# Patient Record
Sex: MALE | Race: WHITE | NOT HISPANIC OR LATINO
[De-identification: names, ages, dates, MRNs, and addresses within clinical notes are randomized per-mention and may not be internally consistent; named-entity substitution may affect disease eponyms.]

---

## 2019-01-01 ENCOUNTER — RX RENEWAL (OUTPATIENT)
Age: 84
End: 2019-01-01

## 2019-01-01 ENCOUNTER — APPOINTMENT (OUTPATIENT)
Dept: NEPHROLOGY | Facility: CLINIC | Age: 84
End: 2019-01-01

## 2019-01-01 ENCOUNTER — APPOINTMENT (OUTPATIENT)
Dept: NEPHROLOGY | Facility: CLINIC | Age: 84
End: 2019-01-01
Payer: MEDICARE

## 2019-01-01 VITALS
RESPIRATION RATE: 16 BRPM | BODY MASS INDEX: 28.36 KG/M2 | HEART RATE: 66 BPM | DIASTOLIC BLOOD PRESSURE: 70 MMHG | SYSTOLIC BLOOD PRESSURE: 126 MMHG | HEIGHT: 73 IN | WEIGHT: 214 LBS

## 2019-01-01 VITALS
SYSTOLIC BLOOD PRESSURE: 98 MMHG | BODY MASS INDEX: 28.17 KG/M2 | WEIGHT: 208 LBS | DIASTOLIC BLOOD PRESSURE: 48 MMHG | HEART RATE: 66 BPM | HEIGHT: 72 IN

## 2019-01-01 DIAGNOSIS — I10 ESSENTIAL (PRIMARY) HYPERTENSION: ICD-10-CM

## 2019-01-01 DIAGNOSIS — R94.30 ABNORMAL RESULT OF CARDIOVASCULAR FUNCTION STUDY, UNSPECIFIED: ICD-10-CM

## 2019-01-01 DIAGNOSIS — D64.9 ANEMIA, UNSPECIFIED: ICD-10-CM

## 2019-01-01 DIAGNOSIS — N17.9 ACUTE KIDNEY FAILURE, UNSPECIFIED: ICD-10-CM

## 2019-01-01 DIAGNOSIS — I35.0 NONRHEUMATIC AORTIC (VALVE) STENOSIS: ICD-10-CM

## 2019-01-01 DIAGNOSIS — N25.81 SECONDARY HYPERPARATHYROIDISM OF RENAL ORIGIN: ICD-10-CM

## 2019-01-01 DIAGNOSIS — N18.4 CHRONIC KIDNEY DISEASE, STAGE 4 (SEVERE): ICD-10-CM

## 2019-01-01 DIAGNOSIS — Z95.810 PRESENCE OF AUTOMATIC (IMPLANTABLE) CARDIAC DEFIBRILLATOR: ICD-10-CM

## 2019-01-01 DIAGNOSIS — E55.9 VITAMIN D DEFICIENCY, UNSPECIFIED: ICD-10-CM

## 2019-01-01 PROCEDURE — 99214 OFFICE O/P EST MOD 30 MIN: CPT

## 2019-01-01 RX ORDER — HYDROCHLOROTHIAZIDE 12.5 MG/1
12.5 TABLET ORAL DAILY
Refills: 0 | Status: DISCONTINUED | COMMUNITY
End: 2019-01-01

## 2019-01-01 RX ORDER — FERROUS SULFATE 325(65) MG
325 (65 FE) TABLET ORAL
Refills: 0 | Status: DISCONTINUED | COMMUNITY
End: 2019-01-01

## 2019-01-01 RX ORDER — TICAGRELOR 60 MG/1
60 TABLET ORAL
Refills: 0 | Status: DISCONTINUED | COMMUNITY
End: 2019-01-01

## 2019-01-01 RX ORDER — CHROMIUM 200 MCG
1000 TABLET ORAL DAILY
Refills: 0 | Status: DISCONTINUED | COMMUNITY
End: 2019-01-01

## 2019-01-01 RX ORDER — NITROGLYCERIN 0.3 MG/1
0.3 TABLET SUBLINGUAL
Refills: 0 | Status: ACTIVE | COMMUNITY

## 2019-01-01 RX ORDER — METOPROLOL SUCCINATE 25 MG/1
25 TABLET, EXTENDED RELEASE ORAL DAILY
Refills: 0 | Status: DISCONTINUED | COMMUNITY
End: 2019-01-01

## 2019-06-04 ENCOUNTER — RECORD ABSTRACTING (OUTPATIENT)
Age: 84
End: 2019-06-04

## 2019-06-04 DIAGNOSIS — Z86.69 PERSONAL HISTORY OF OTHER DISEASES OF THE NERVOUS SYSTEM AND SENSE ORGANS: ICD-10-CM

## 2019-06-04 DIAGNOSIS — Z87.39 PERSONAL HISTORY OF OTHER DISEASES OF THE MUSCULOSKELETAL SYSTEM AND CONNECTIVE TISSUE: ICD-10-CM

## 2019-06-04 DIAGNOSIS — M25.559 PAIN IN UNSPECIFIED HIP: ICD-10-CM

## 2019-06-04 DIAGNOSIS — K86.9 DISEASE OF PANCREAS, UNSPECIFIED: ICD-10-CM

## 2019-06-04 DIAGNOSIS — Z85.820 PERSONAL HISTORY OF MALIGNANT MELANOMA OF SKIN: ICD-10-CM

## 2019-06-04 DIAGNOSIS — Z87.438 PERSONAL HISTORY OF OTHER DISEASES OF MALE GENITAL ORGANS: ICD-10-CM

## 2019-06-04 DIAGNOSIS — Z85.118 PERSONAL HISTORY OF OTHER MALIGNANT NEOPLASM OF BRONCHUS AND LUNG: ICD-10-CM

## 2019-06-04 DIAGNOSIS — N28.1 CYST OF KIDNEY, ACQUIRED: ICD-10-CM

## 2019-06-04 DIAGNOSIS — Z86.79 PERSONAL HISTORY OF OTHER DISEASES OF THE CIRCULATORY SYSTEM: ICD-10-CM

## 2019-06-04 DIAGNOSIS — E21.3 HYPERPARATHYROIDISM, UNSPECIFIED: ICD-10-CM

## 2019-06-04 DIAGNOSIS — Z87.442 PERSONAL HISTORY OF URINARY CALCULI: ICD-10-CM

## 2019-06-04 DIAGNOSIS — I25.2 OLD MYOCARDIAL INFARCTION: ICD-10-CM

## 2019-06-04 DIAGNOSIS — M17.10 UNILATERAL PRIMARY OSTEOARTHRITIS, UNSPECIFIED KNEE: ICD-10-CM

## 2019-06-04 PROBLEM — Z00.00 ENCOUNTER FOR PREVENTIVE HEALTH EXAMINATION: Status: ACTIVE | Noted: 2019-06-04

## 2019-06-04 RX ORDER — PARICALCITOL 1 UG/1
1 CAPSULE, LIQUID FILLED ORAL
Refills: 0 | Status: DISCONTINUED | COMMUNITY
End: 2019-06-04

## 2019-06-04 RX ORDER — CEPHALEXIN 500 MG/1
500 CAPSULE ORAL
Refills: 0 | Status: DISCONTINUED | COMMUNITY
End: 2019-06-04

## 2019-06-04 RX ORDER — ALLOPURINOL 300 MG/1
300 TABLET ORAL DAILY
Refills: 0 | Status: ACTIVE | COMMUNITY

## 2019-06-04 RX ORDER — ASPIRIN 81 MG/1
81 TABLET, CHEWABLE ORAL DAILY
Refills: 0 | Status: ACTIVE | COMMUNITY

## 2019-06-04 RX ORDER — SILODOSIN 8 MG/1
8 CAPSULE ORAL
Refills: 0 | Status: DISCONTINUED | COMMUNITY
End: 2019-06-04

## 2019-06-04 RX ORDER — TERAZOSIN 5 MG/1
5 CAPSULE ORAL
Refills: 0 | Status: ACTIVE | COMMUNITY

## 2019-06-04 RX ORDER — CALCITRIOL 0.25 UG/1
0.25 CAPSULE, LIQUID FILLED ORAL
Refills: 0 | Status: DISCONTINUED | COMMUNITY
End: 2019-06-04

## 2019-06-04 RX ORDER — ROSUVASTATIN CALCIUM 20 MG/1
20 TABLET, FILM COATED ORAL DAILY
Refills: 0 | Status: ACTIVE | COMMUNITY

## 2019-06-24 ENCOUNTER — APPOINTMENT (OUTPATIENT)
Dept: NEPHROLOGY | Facility: CLINIC | Age: 84
End: 2019-06-24
Payer: MEDICARE

## 2019-06-24 VITALS
WEIGHT: 218 LBS | HEIGHT: 73 IN | SYSTOLIC BLOOD PRESSURE: 132 MMHG | DIASTOLIC BLOOD PRESSURE: 78 MMHG | BODY MASS INDEX: 28.89 KG/M2

## 2019-06-24 DIAGNOSIS — E87.5 HYPERKALEMIA: ICD-10-CM

## 2019-06-24 PROCEDURE — 99214 OFFICE O/P EST MOD 30 MIN: CPT

## 2019-06-24 RX ORDER — BIMATOPROST 0.1 MG/ML
0.01 SOLUTION/ DROPS OPHTHALMIC DAILY
Refills: 0 | Status: ACTIVE | COMMUNITY

## 2019-06-24 NOTE — REVIEW OF SYSTEMS
[Feeling Tired] : feeling tired [Loss Of Hearing] : hearing loss [Recent Weight Loss (___ Lbs)] : recent [unfilled] ~Ulb weight loss [Cough] : cough [SOB on Exertion] : shortness of breath during exertion [Joint Pain] : joint pain [Easy Bruising] : a tendency for easy bruising [Itching] : itching [Negative] : Neurological [FreeTextEntry2] : Tires easily. Has been trying to lose weight.  [de-identified] : "not as bad as it used to be"  Believes it is the change of seasons [FreeTextEntry6] : [place] is "about the same".  Chronic cough. [FreeTextEntry9] : knees, hips, shoulders

## 2019-06-24 NOTE — PHYSICAL EXAM
[General Appearance - Alert] : alert [General Appearance - Well Nourished] : well nourished [General Appearance - In No Acute Distress] : in no acute distress [General Appearance - Well-Appearing] : healthy appearing [General Appearance - Well Developed] : well developed [Extraocular Movements] : extraocular movements were intact [] : no respiratory distress [Exaggerated Use Of Accessory Muscles For Inspiration] : no accessory muscle use [Auscultation Breath Sounds / Voice Sounds] : lungs were clear to auscultation bilaterally [Heart Rate And Rhythm] : heart rate was normal and rhythm regular [Heart Sounds] : normal S1 and S2 [Heart Sounds Pericardial Friction Rub] : no pericardial rub [Heart Sounds Gallop] : no gallops [Bowel Sounds] : normal bowel sounds [Edema] : there was no peripheral edema [Abdomen Tenderness] : non-tender [Abnormal Walk] : normal gait [Abdomen Soft] : soft [Involuntary Movements] : no involuntary movements were seen [Skin Color & Pigmentation] : normal skin color and pigmentation [No Focal Deficits] : no focal deficits [Skin Turgor] : normal skin turgor [Oriented To Time, Place, And Person] : oriented to person, place, and time [FreeTextEntry1] : 3/6 TIM and 2/4 DM best heard in the RUSB

## 2019-06-24 NOTE — HISTORY OF PRESENT ILLNESS
[FreeTextEntry1] : Kenyon Bravo is an 84 year old gentleman that I see for his CKD.  His medical history is outlined below.  He has been doing "not bad".  He saw a pain management physician this morning.  His full ROS is below.

## 2019-06-24 NOTE — ASSESSMENT
[FreeTextEntry1] : Kenyon oJseph is an 84 year old gentleman with Stage IV chronic kidney disease and the following recent BUN/creatinine levels:  67/3.64 (03/09/2018), 56/3.61 (11/13/2018) and most recently 77/4.14 (06/18/2019).  His urine protein to creatinine ratio is 292 mg/g (normal is less than 128 mg/g).  The differential diagnosis of his CKD includes hypertensive nephrosclerosis, atheroembolic disease, reflux nephropathy and a possible contribution from the thiazide diuretic.  His blood pressure is well controlled.  His serum phosphorous is controlled (4.6 mg/dL). The intact PTH is 77 pg/cc (controlled).  His diabetes mellitus is well controlled (HbA1c 6.1%).  Mr. Freeman's last renal ultrasound was on 02/23/2017 (12.3 cm right kidney with multiple cysts, 14.4 cm with multiple cysts, calcification in the upper pole of the right kidney measures 8 mm).\par \par (1) Stage IV CKD\par (2) Anemia.  Hb 9.8 g/dL (TSAT 21%, ferritin 132 ng/cc)\par (3) Hyperphosphatemia.  Controlled.\par (4) Secondary hyperparathyroidism\par (5) Renal cysts.  One calcified renal cyst (has seen Dr. Paul Perez re: this).\par \par PLAN:\par \par (1) Continue the Zemplar for the secondary hyperparathyroidism\par (2) Continue to take the oral iron tablet.  Increase its frequency to daily.\par (3) We talked about hemodialysis and peritoneal dialysis.  I will make sure that Mr. Bravo gets a DVD re: life choices for Stage V chronic kidney disease.  He will decide which of these treatments he wants.  We talked about home versus in center dialysis today also.  All questions were answered.  I will follow up with Mr. Bravo on the phone.  He will see me in September 2019 with the following lab studies: CMP, CBC, phosphorous, intact PTH, iron, TIBC, ferritin.

## 2019-09-17 PROBLEM — I10 HYPERTENSION: Status: ACTIVE | Noted: 2019-06-04

## 2019-09-17 NOTE — CONSULT LETTER
[Dear  ___] : Dear  [unfilled], [Consult Letter:] : I had the pleasure of evaluating your patient, [unfilled]. [Consult Closing:] : Thank you very much for allowing me to participate in the care of this patient.  If you have any questions, please do not hesitate to contact me. [Sincerely,] : Sincerely, [FreeTextEntry3] : Jose Daniel [DrLuis  ___] : Dr. GLEASON [DrLuis ___] : Dr. GLEASON

## 2019-09-17 NOTE — REVIEW OF SYSTEMS
[As Noted in HPI] : as noted in HPI [SOB on Exertion] : shortness of breath during exertion [Nocturia] : nocturia [Negative] : Heme/Lymph [FreeTextEntry9] : cramping at times

## 2019-09-17 NOTE — HISTORY OF PRESENT ILLNESS
[FreeTextEntry1] : Kenyon Bravo is an 84-year old gentleman that I see for his CKD.  His medical history is outlined below.  He feels well.  He had a nuclear stress test with Dr. Nascimento last week.  He saw urology (Dr. Perez).  He is planning on seeing pain management in 2 days because his back has been "really hurting me".  He takes Tylenol for the pain. His appetite has been good. He denies any nausea or vomiting.  He occasionally has dyspnea with exertion. This is without change.  His weight has been stable.  He denies having any lethargy though his wife reports that he becomes very tired.  He naps in the afternoons. His full ROS is below.

## 2019-09-17 NOTE — PHYSICAL EXAM
[General Appearance - In No Acute Distress] : in no acute distress [General Appearance - Alert] : alert [Sclera] : the sclera and conjunctiva were normal [Neck Appearance] : the appearance of the neck was normal [Extraocular Movements] : extraocular movements were intact [] : no respiratory distress [Exaggerated Use Of Accessory Muscles For Inspiration] : no accessory muscle use [Respiration, Rhythm And Depth] : normal respiratory rhythm and effort [Heart Sounds] : normal S1 and S2 [Heart Rate And Rhythm] : heart rate was normal and rhythm regular [Heart Sounds Pericardial Friction Rub] : no pericardial rub [Heart Sounds Gallop] : no gallops [FreeTextEntry1] : Minimal pedal edema bilaterally [Bowel Sounds] : normal bowel sounds [Abdomen Soft] : soft [Abdomen Tenderness] : non-tender [Abnormal Walk] : normal gait [Involuntary Movements] : no involuntary movements were seen [Skin Turgor] : normal skin turgor [Skin Color & Pigmentation] : normal skin color and pigmentation [No Focal Deficits] : no focal deficits [Oriented To Time, Place, And Person] : oriented to person, place, and time [Impaired Insight] : insight and judgment were intact [Affect] : the affect was normal [Mood] : the mood was normal

## 2019-09-17 NOTE — ASSESSMENT
[FreeTextEntry1] : Kenyon Joseph is an 84 year old gentleman with Stage IV chronic kidney disease and the following recent BUN/creatinine levels:  67/3.64 (03/09/2018), 56/3.61 (11/13/2018)  77/4.14 (06/18/2019), and most recently 72/3.80 (09/04/2019).   The prior urine protein to creatinine ratio was 292 mg/g (normal is less than 128 mg/g).  \par \par The differential diagnosis of his CKD includes hypertensive nephrosclerosis, atheroembolic disease, reflux nephropathy and a possible contribution from the thiazide diuretic.  His blood pressure is well controlled.  His serum phosphorous is controlled (4.6 mg/dL). The intact PTH is 75 pg/cc (controlled).  His diabetes mellitus is well controlled (HbA1c 5.8%).  Mr. Freeman's last renal ultrasound was on 02/23/2017 (12.3 cm right kidney with multiple cysts, 14.4 cm with multiple cysts, calcification in the upper pole of the right kidney measures 8 mm).\par \par (1) Stage IV CKD.  Stable.\par (2) Anemia.  Hb 10.2 g/dL (TSAT 21 --> 27%, ferritin 132 --> 139 ng/cc)\par (3) Hyperphosphatemia.  Controlled.\par (4) Secondary hyperparathyroidism.  Controlled. \par (5) Renal cysts.  One calcified renal cyst (has seen Dr. Paul Perez re: this).\par \par PLAN:\par \par (1) Continue the Zemplar for the secondary hyperparathyroidism\par (2) Continue to take the oral iron tablet daily. \par (3) We talked about hemodialysis and peritoneal dialysis.  I will make sure that Mr. Bravo gets a DVD re: life choices for Stage V chronic kidney disease.  He will decide which of these treatments he wants.  Mr. Bravo will see me again in four months with the following lab studies: CMP, CBC, phosphorous, intact PTH, iron, TIBC, ferritin.

## 2019-12-23 PROBLEM — E55.9 VITAMIN D DEFICIENCY: Status: ACTIVE | Noted: 2019-06-04

## 2019-12-23 PROBLEM — N25.81 SECONDARY HYPERPARATHYROIDISM: Status: ACTIVE | Noted: 2019-06-04

## 2019-12-23 PROBLEM — N17.9 ACUTE KIDNEY FAILURE: Status: ACTIVE | Noted: 2019-01-01

## 2019-12-23 PROBLEM — N18.4 STAGE 4 CHRONIC KIDNEY DISEASE: Status: ACTIVE | Noted: 2019-06-04

## 2019-12-23 PROBLEM — I35.0 AORTIC STENOSIS: Status: ACTIVE | Noted: 2019-01-01

## 2019-12-23 PROBLEM — D64.9 ANEMIA: Status: ACTIVE | Noted: 2019-06-04

## 2019-12-23 PROBLEM — Z95.810 AICD (AUTOMATIC CARDIOVERTER/DEFIBRILLATOR) PRESENT: Status: ACTIVE | Noted: 2019-01-01

## 2019-12-23 PROBLEM — R94.30 CARDIAC LV EJECTION FRACTION <20%: Status: ACTIVE | Noted: 2019-01-01

## 2019-12-23 NOTE — CONSULT LETTER
[Dear  ___] : Dear  [unfilled], [Consult Letter:] : I had the pleasure of evaluating your patient, [unfilled]. [Consult Closing:] : Thank you very much for allowing me to participate in the care of this patient.  If you have any questions, please do not hesitate to contact me. [Sincerely,] : Sincerely, [DrLuis  ___] : Dr. GLEASON [FreeTextEntry3] : Jose Daniel [DrLuis ___] : Dr. GLEASON

## 2019-12-23 NOTE — ASSESSMENT
[FreeTextEntry1] : Kenyon Joseph is an 84 year old gentleman with Stage IV chronic kidney disease and the following recent BUN/creatinine levels:  67/3.64 (03/09/2018), 56/3.61 (11/13/2018), 77/4.14 (06/18/2019), 70/4.1 (11/25/2019) who was recently admitted to [facility] with aortic stenosis, acute kidney injury, and is currently s/p AICD/PPM.  I currently do not have a copy of the hospital course.  His serum creatinine increased to 6 mg/dL (BUN/creatinine 107/6.0 on 12/10/2019).  I do not have any of the post hospital lab studies.\par \par His last urine protein to creatinine ratio was 292 mg/g (normal is less than 128 mg/g).  The differential diagnosis of his CKD includes hypertensive nephrosclerosis, atheroembolic disease, reflux nephropathy and a possible contribution from the thiazide diuretic (which he is currently off of)..  His blood pressure is currently on the low side.  Mr. Freeman's last renal ultrasound was on 02/23/2017 (12.3 cm right kidney with multiple cysts, 14.4 cm with multiple cysts, calcification in the upper pole of the right kidney measures 8 mm).\par \par (1) Stage IV CKD\par (2) Recent DENA\par (3) Anemia.  \par (4) Hyperphosphatemia.  \par (5) Secondary hyperparathyroidism\par (6) Renal cysts.  One calcified renal cyst (has seen Dr. Paul Perez re: this).\par \par PLAN:\par \par (1) Continue the Zemplar for the secondary hyperparathyroidism\par (2) I ordered the following lab studies: See below.\par (3) We talked about hemodialysis and peritoneal dialysis during a prior visit.  I will make sure that Mr. Bravo gets a DVD re: life choices for Stage V chronic kidney disease.  He will decide which of these treatments he wants.  We talked about home versus in center dialysis today also.  All questions were answered.  I will follow up with Mr. Bravo on the phone.  He will see me in September 2019 with the following lab studies: CMP, CBC, phosphorous, intact PTH, iron, TIBC, ferritin.

## 2019-12-23 NOTE — PHYSICAL EXAM
[General Appearance - Alert] : alert [Sclera] : the sclera and conjunctiva were normal [General Appearance - In No Acute Distress] : in no acute distress [Extraocular Movements] : extraocular movements were intact [] : no respiratory distress [Neck Appearance] : the appearance of the neck was normal [Respiration, Rhythm And Depth] : normal respiratory rhythm and effort [Auscultation Breath Sounds / Voice Sounds] : lungs were clear to auscultation bilaterally [Heart Rate And Rhythm] : heart rate was normal and rhythm regular [Exaggerated Use Of Accessory Muscles For Inspiration] : no accessory muscle use [Heart Sounds] : normal S1 and S2 [Heart Sounds Gallop] : no gallops [Heart Sounds Pericardial Friction Rub] : no pericardial rub [Edema] : there was no peripheral edema [Bowel Sounds] : normal bowel sounds [Abdomen Soft] : soft [No CVA Tenderness] : no ~M costovertebral angle tenderness [Abdomen Tenderness] : non-tender [Abnormal Walk] : normal gait [No Spinal Tenderness] : no spinal tenderness [Involuntary Movements] : no involuntary movements were seen [Skin Color & Pigmentation] : normal skin color and pigmentation [Skin Turgor] : normal skin turgor [No Focal Deficits] : no focal deficits [Oriented To Time, Place, And Person] : oriented to person, place, and time [Impaired Insight] : insight and judgment were intact [Mood] : the mood was normal [Affect] : the affect was normal [FreeTextEntry1] : Minimal pedal edema bilaterally

## 2019-12-23 NOTE — HISTORY OF PRESENT ILLNESS
[FreeTextEntry1] : Kenyon Bravo is an 84-year old gentleman that I see for his CKD.  His medical history is outlined below.  Kenyon was admitted to [facility] for the past two weeks. Prior to admission he was treated for an acute bronchitis. He continued to feel poorly and saw Dr. Denney.  He had an abnormal EKG and underwent nuclear stress testing and an echocardiogram. He was told he had aortic stenosis. In addition he was started on torsemide for lower extremity edema.  Lab work was later submitted and he was told he was in kidney failure.  His creatinine level was 6 mg/dL.  He presented to [facility].  In addition he was told he had suffered a mild heart attack.  Kenyon was transferred to [facility].  Currently, Kenyon is feeling tired though he feels he is improving.

## 2019-12-23 NOTE — REVIEW OF SYSTEMS
[Fever] : fever [Chills] : chills [Nocturia] : nocturia [Joint Pain] : joint pain [Easy Bleeding] : a tendency for easy bleeding [Negative] : Neurological [FreeTextEntry4] : post nasal drip [FreeTextEntry2] : weight loss (7-8 pounds) [FreeTextEntry5] : occasional lower extremity edema-resolved [FreeTextEntry6] : periodic cough and wheeze over the past 2-3 months [FreeTextEntry8] : nocturia x 1 [de-identified] : "I'm always itchy"- occurs in the [place] only [de-identified] : always feels cold

## 2020-01-01 ENCOUNTER — RX RENEWAL (OUTPATIENT)
Age: 85
End: 2020-01-01

## 2020-01-01 ENCOUNTER — APPOINTMENT (OUTPATIENT)
Dept: VASCULAR SURGERY | Facility: CLINIC | Age: 85
End: 2020-01-01
Payer: MEDICARE

## 2020-01-01 ENCOUNTER — APPOINTMENT (OUTPATIENT)
Dept: HEART AND VASCULAR | Facility: CLINIC | Age: 85
End: 2020-01-01
Payer: MEDICARE

## 2020-01-01 ENCOUNTER — APPOINTMENT (OUTPATIENT)
Dept: VASCULAR SURGERY | Facility: CLINIC | Age: 85
End: 2020-01-01

## 2020-01-01 ENCOUNTER — APPOINTMENT (OUTPATIENT)
Dept: VASCULAR SURGERY | Facility: HOSPITAL | Age: 85
End: 2020-01-01
Payer: MEDICARE

## 2020-01-01 ENCOUNTER — APPOINTMENT (OUTPATIENT)
Dept: NEPHROLOGY | Facility: CLINIC | Age: 85
End: 2020-01-01

## 2020-01-01 ENCOUNTER — CLINICAL ADVICE (OUTPATIENT)
Age: 85
End: 2020-01-01

## 2020-01-01 VITALS — HEART RATE: 113 BPM | SYSTOLIC BLOOD PRESSURE: 94 MMHG | DIASTOLIC BLOOD PRESSURE: 62 MMHG

## 2020-01-01 VITALS
DIASTOLIC BLOOD PRESSURE: 58 MMHG | BODY MASS INDEX: 23.16 KG/M2 | HEIGHT: 72 IN | WEIGHT: 171 LBS | HEART RATE: 78 BPM | SYSTOLIC BLOOD PRESSURE: 92 MMHG | RESPIRATION RATE: 12 BRPM

## 2020-01-01 VITALS
WEIGHT: 180 LBS | HEIGHT: 72 IN | BODY MASS INDEX: 24.38 KG/M2 | DIASTOLIC BLOOD PRESSURE: 68 MMHG | HEART RATE: 86 BPM | SYSTOLIC BLOOD PRESSURE: 103 MMHG

## 2020-01-01 DIAGNOSIS — E83.39 OTHER DISORDERS OF PHOSPHORUS METABOLISM: ICD-10-CM

## 2020-01-01 DIAGNOSIS — R09.89 OTHER SPECIFIED SYMPTOMS AND SIGNS INVOLVING THE CIRCULATORY AND RESPIRATORY SYSTEMS: ICD-10-CM

## 2020-01-01 DIAGNOSIS — N18.6 END STAGE RENAL DISEASE: ICD-10-CM

## 2020-01-01 DIAGNOSIS — N39.0 URINARY TRACT INFECTION, SITE NOT SPECIFIED: ICD-10-CM

## 2020-01-01 DIAGNOSIS — I72.4 ANEURYSM OF ARTERY OF LOWER EXTREMITY: ICD-10-CM

## 2020-01-01 PROCEDURE — 99202 OFFICE O/P NEW SF 15 MIN: CPT | Mod: 95

## 2020-01-01 PROCEDURE — 99024 POSTOP FOLLOW-UP VISIT: CPT

## 2020-01-01 PROCEDURE — 99213 OFFICE O/P EST LOW 20 MIN: CPT | Mod: 95

## 2020-01-01 PROCEDURE — 99214 OFFICE O/P EST MOD 30 MIN: CPT

## 2020-01-01 PROCEDURE — 93971 EXTREMITY STUDY: CPT

## 2020-01-01 PROCEDURE — 93926 LOWER EXTREMITY STUDY: CPT

## 2020-01-01 PROCEDURE — 99214 OFFICE O/P EST MOD 30 MIN: CPT | Mod: 24

## 2020-01-01 PROCEDURE — 99203 OFFICE O/P NEW LOW 30 MIN: CPT

## 2020-01-01 PROCEDURE — 36821 AV FUSION DIRECT ANY SITE: CPT | Mod: LT

## 2020-01-01 RX ORDER — CEFUROXIME AXETIL 500 MG/1
500 TABLET ORAL
Qty: 15 | Refills: 1 | Status: ACTIVE | COMMUNITY
Start: 2020-01-01 | End: 1900-01-01

## 2020-01-01 RX ORDER — CALCIUM ACETATE 667 MG
667 TABLET ORAL
Qty: 90 | Refills: 1 | Status: ACTIVE | COMMUNITY
Start: 2020-01-01 | End: 1900-01-01

## 2020-01-01 RX ORDER — CEFUROXIME AXETIL 500 MG/1
500 TABLET ORAL
Qty: 4 | Refills: 0 | Status: ACTIVE | COMMUNITY
Start: 2020-01-01 | End: 1900-01-01

## 2020-01-01 RX ORDER — ISOSORBIDE MONONITRATE 30 MG/1
30 TABLET, EXTENDED RELEASE ORAL DAILY
Refills: 0 | Status: DISCONTINUED | COMMUNITY
End: 2020-01-01

## 2020-01-01 RX ORDER — HYDRALAZINE HYDROCHLORIDE 50 MG/1
50 TABLET ORAL 3 TIMES DAILY
Qty: 270 | Refills: 3 | Status: DISCONTINUED | COMMUNITY
End: 2020-01-01

## 2020-01-01 RX ORDER — APIXABAN 2.5 MG/1
2.5 TABLET, FILM COATED ORAL
Refills: 0 | Status: ACTIVE | COMMUNITY

## 2020-01-01 RX ORDER — METOPROLOL SUCCINATE 100 MG/1
100 TABLET, EXTENDED RELEASE ORAL DAILY
Qty: 90 | Refills: 1 | Status: DISCONTINUED | COMMUNITY
End: 2020-01-01

## 2020-01-01 RX ORDER — PARICALCITOL 1 UG/1
1 CAPSULE ORAL
Qty: 90 | Refills: 1 | Status: DISCONTINUED | COMMUNITY
End: 2020-01-01

## 2020-01-01 RX ORDER — SEVELAMER CARBONATE 800 MG/1
800 TABLET, FILM COATED ORAL 3 TIMES DAILY
Qty: 270 | Refills: 0 | Status: DISCONTINUED | COMMUNITY
Start: 2020-01-01 | End: 2020-01-01

## 2020-02-13 PROBLEM — E83.39 HYPERPHOSPHATEMIA: Status: ACTIVE | Noted: 2020-01-01

## 2020-03-02 NOTE — REVIEW OF SYSTEMS
[Fever] : no fever [Feeling Tired] : feeling tired [Chills] : no chills [Limb Weakness] : no limb weakness [Difficulty Walking] : no difficulty walking

## 2020-03-02 NOTE — ASSESSMENT
[FreeTextEntry1] : 86 yo male with ESRD currently undergoing HD via a left IJ PC. I recommended that he undergo vein mapping. Depending on the results of the vein mapping he may be a candidate for an AVF or AVG.\par \par The patient will follow up when the results are available.

## 2020-03-02 NOTE — HISTORY OF PRESENT ILLNESS
[FreeTextEntry1] : 84 yo male referred by Dr. Alonso for a HD access consultation. He is right hand dominant. He was started on HD via a [employer] [facility] PC several weeks ago. He has a history of a right chest wall AICD.

## 2020-03-02 NOTE — PHYSICAL EXAM
[JVD] : no jugular venous distention  [Normal Rate and Rhythm] : normal rate and rhythm [Normal Breath Sounds] : Normal breath sounds [Ankle Swelling (On Exam)] : not present [2+] : right 2+ [Ankle Swelling Bilaterally] : bilaterally  [Alert] : alert [Oriented to Time] : oriented to time [Oriented to Place] : oriented to place [de-identified] : Awake and Alert [de-identified] : LIJ PC in place, AICD right chest [de-identified] : palpable cephalic vein left wrist, bilateral hands warm [de-identified] : appropriate

## 2020-03-02 NOTE — REASON FOR VISIT
[Initial Evaluation] : an initial evaluation [Spouse] : spouse [Family Member] : family member [FreeTextEntry1] : HD access evaluation

## 2020-03-06 PROBLEM — R09.89 DIMINISHED PULSES IN LOWER EXTREMITY: Status: ACTIVE | Noted: 2020-01-01

## 2020-03-06 NOTE — DATA REVIEWED
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

## 2020-03-06 NOTE — PHYSICAL EXAM
[Normal Breath Sounds] : Normal breath sounds [Normal Rate and Rhythm] : normal rate and rhythm [2+] : left 2+ [0] : left 0 [Ankle Swelling Bilaterally] : bilaterally  [Alert] : alert [Oriented to Place] : oriented to place [Oriented to Time] : oriented to time [JVD] : no jugular venous distention  [Ankle Swelling (On Exam)] : not present [de-identified] : Awake and Alert [de-identified] : LIJ PC in place, AICD right chest [FreeTextEntry1] : prominent left popliteal pulse [de-identified] : palpable cephalic vein left wrist, bilateral hands warm [de-identified] : appropriate

## 2020-03-06 NOTE — ASSESSMENT
[FreeTextEntry1] : 86 yo male with ESRD currently undergoing HD via a left IJ PC.\par \par HD access - patient has a marginal cephalic vein at the antecubital fossa. 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 I recommended that he undergo a CTA in preparation for repair of the aneurysm. I discussed the high incidence of limb loss if the aneurysm thromboses.\par \par Will follow up with the patient's when the results of the CTA are available

## 2020-03-06 NOTE — REVIEW OF SYSTEMS
[Feeling Tired] : feeling tired [Fever] : no fever [Chills] : no chills [Limb Weakness] : no limb weakness [Difficulty Walking] : no difficulty walking

## 2020-03-06 NOTE — HISTORY OF PRESENT ILLNESS
[FreeTextEntry1] : 86 yo male referred by Dr. Alonso for a HD access consultation. He is right hand dominant. He was started on HD via a D2S PC several weeks ago. He has a history of a right chest wall AICD.  [de-identified] : Patient returns in follow up for a HD access consultation. He is right hand dominant. He is currently undergoing HD via a left IJ PC. He has a right sided AICD. He is currently on Eliquis. He is scheduled for left upper extremity vein mapping.

## 2020-04-09 PROBLEM — M25.559 HIP PAIN: Status: ACTIVE | Noted: 2019-06-04

## 2020-06-01 NOTE — REASON FOR VISIT
[Home] : at home, [unfilled] , at the time of the visit. [Medical Office: ([facility])___] : at the medical office located in  [Family Member] : family member [Verbal consent obtained from patient] : the patient, [unfilled]

## 2020-06-01 NOTE — ASSESSMENT
[FreeTextEntry1] : 84 yo male with ESRD currently undergoing HD via a left IJ PC.\par \par HD access - patient has a marginal cephalic vein at the antecubital fossa. 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 He would like to delay repair at this time. He does not feel comfortable being admitted to the hospital overnight or without visitors. He understands there is a risk of limb loss if the aneurysm thromboses. \par \par Risks and benefits of AV access discussed with patient and his daughter who agree to proceed.\par \par Will need to stop Eliquis 48 hours prior to procedure.

## 2020-06-01 NOTE — REVIEW OF SYSTEMS
[Fever] : no fever [Chills] : no chills [Lower Ext Edema] : no extremity edema [Limb Pain] : no limb pain [Limb Swelling] : no limb swelling

## 2020-06-01 NOTE — PHYSICAL EXAM
[JVD] : no jugular venous distention  [Normal Breath Sounds] : Normal breath sounds [Alert] : alert [Oriented to Person] : oriented to person [Oriented to Place] : oriented to place [Oriented to Time] : oriented to time [de-identified] : Awake and Alert [Calm] : calm [de-identified] : hands pink [de-identified] : No gross motor or sensory deficits [de-identified] : DEYVI pc in place, RIJ AICD

## 2020-06-01 NOTE — HISTORY OF PRESENT ILLNESS
[FreeTextEntry1] : 86 yo male with ESRD in need of permanent HD access.The patient is currently undergoing HD via a left PC. He has HD on T/TH/Sat. He was undergoing a work up for HD access prior to the Covid 19 crisis. 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 I recommended that the patient undergo either open or endovascular repair. We were awaiting cardiac evaluation.\par \par The patient is on Eliquis.

## 2020-07-06 PROBLEM — N18.6 END STAGE RENAL DISEASE: Status: ACTIVE | Noted: 2020-01-01

## 2020-07-06 PROBLEM — I72.4 POPLITEAL ARTERY ANEURYSM: Status: ACTIVE | Noted: 2020-01-01

## 2020-07-06 NOTE — CONSULT LETTER
[Consult Letter:] : I had the pleasure of evaluating your patient, [unfilled]. [Dear  ___] : Dear  [unfilled], [Please see my note below.] : Please see my note below. [Consult Closing:] : Thank you very much for allowing me to participate in the care of this patient.  If you have any questions, please do not hesitate to contact me. [FreeTextEntry2] : Domingo Nascimento [FreeTextEntry3] : Nate De La Paz MD, RPVI\par Chief, Vascular Surgery\par  [Sincerely,] : Sincerely,

## 2020-07-06 NOTE — REVIEW OF SYSTEMS
[Fever] : no fever [Lower Ext Edema] : lower extremity edema [Chills] : no chills [Shortness Of Breath] : shortness of breath [SOB on Exertion] : shortness of breath during exertion [Limb Swelling] : limb swelling [Limb Pain] : no limb pain [Limb Weakness] : limb weakness [Difficulty Walking] : difficulty walking

## 2020-07-06 NOTE — HISTORY OF PRESENT ILLNESS
[FreeTextEntry1] : 84 yo male with ESRD s/p creation of a left brachial cephalic AVF. He denies pain or numbness in his left hand. He reports worsening SOB and edema since the procedure. He is scheduled to see Dr. Stewart at [facility] for further cardiac evaluation. 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 I recommended that the patient undergo either open or endovascular repair. We were awaiting cardiac evaluation.\par \par The patient is on Eliquis.

## 2020-07-06 NOTE — ASSESSMENT
[FreeTextEntry1] : 84 yo male with ESRD currently undergoing HD via a left IJ PC.\par \par HD access - s/p AVF. AVF is clinically patent. Continue HD via PC. 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 HE is now anxious to have the aneurysm repaired. Awaiting cardiac eval. THe patient has an appointment on 7/13.\par \par

## 2020-07-06 NOTE — PHYSICAL EXAM
[JVD] : no jugular venous distention  [Respiratory Effort] : normal respiratory effort [2+] : left 2+ [Ankle Swelling (On Exam)] : present [Ankle Swelling Bilaterally] : bilaterally  [Alert] : alert [Ankle Swelling On The Left] : moderate [Oriented to Person] : oriented to person [Oriented to Place] : oriented to place [Oriented to Time] : oriented to time [de-identified] : Awake and Alert [Calm] : calm [de-identified] : DEYVI pc in place, RIJ AICD [FreeTextEntry1] : palpable thrill in AVF [de-identified] : incision healing appropriately, left hand pink, slightly cool [de-identified] : No gross motor or sensory deficits

## 2020-12-23 PROBLEM — N39.0 URINARY TRACT INFECTION, ACUTE: Status: RESOLVED | Noted: 2020-01-01 | Resolved: 2020-12-23
